# Patient Record
Sex: MALE | Race: BLACK OR AFRICAN AMERICAN | NOT HISPANIC OR LATINO | Employment: STUDENT | URBAN - METROPOLITAN AREA
[De-identification: names, ages, dates, MRNs, and addresses within clinical notes are randomized per-mention and may not be internally consistent; named-entity substitution may affect disease eponyms.]

---
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
## 2025-02-27 ENCOUNTER — OFFICE VISIT (OUTPATIENT)
Dept: URGENT CARE | Facility: CLINIC | Age: 20
End: 2025-02-27
Payer: COMMERCIAL

## 2025-02-27 VITALS
RESPIRATION RATE: 18 BRPM | HEIGHT: 67 IN | BODY MASS INDEX: 28.09 KG/M2 | WEIGHT: 179 LBS | TEMPERATURE: 98 F | HEART RATE: 80 BPM | OXYGEN SATURATION: 100 %

## 2025-02-27 DIAGNOSIS — H10.9 BACTERIAL CONJUNCTIVITIS OF LEFT EYE: Primary | ICD-10-CM

## 2025-02-27 PROCEDURE — 99214 OFFICE O/P EST MOD 30 MIN: CPT | Mod: S$GLB,,, | Performed by: PHYSICIAN ASSISTANT

## 2025-02-27 RX ORDER — LEVETIRACETAM 250 MG/1
250 TABLET ORAL
COMMUNITY
Start: 2024-12-18 | End: 2025-12-18

## 2025-02-27 RX ORDER — NEOMYCIN SULFATE, POLYMYXIN B SULFATE AND DEXAMETHASONE 3.5; 10000; 1 MG/ML; [USP'U]/ML; MG/ML
2 SUSPENSION/ DROPS OPHTHALMIC 3 TIMES DAILY
Qty: 5 ML | Refills: 0 | Status: SHIPPED | OUTPATIENT
Start: 2025-02-27

## 2025-02-27 NOTE — PROGRESS NOTES
"Subjective:      Patient ID: Joseph Vela is a 19 y.o. male.    Vitals:  height is 5' 7" (1.702 m) and weight is 81.2 kg (179 lb 0.2 oz). His tympanic temperature is 98 °F (36.7 °C). His pulse is 80. His respiration is 18 and oxygen saturation is 100%.     Chief Complaint: Eye Problem    Patient presents with redness in his left eye. Onset was Monday. Patient's father stated that he gave him eye drops that he had left over from when he had pink eye.     Eye Problem   The left eye is affected. This is a new problem. The current episode started in the past 7 days. The problem occurs constantly. The problem has been unchanged. There was no injury mechanism. The pain is at a severity of 0/10. The patient is experiencing no pain. There is No known exposure to pink eye. He Does not wear contacts. Associated symptoms include eye redness. He has tried eye drops for the symptoms. The treatment provided no relief.       Eyes:  Positive for eye redness.      Objective:     Physical Exam   Constitutional: He is oriented to person, place, and time. He appears well-developed.   HENT:   Head: Normocephalic and atraumatic.   Ears:   Right Ear: External ear normal.   Left Ear: External ear normal.   Nose: Nose normal.   Mouth/Throat: Oropharynx is clear and moist.   Eyes: EOM and lids are normal. Left conjunctiva is injected.   Neck: Trachea normal and phonation normal. Neck supple.   Cardiovascular: Normal rate.   Pulmonary/Chest: Effort normal.   Musculoskeletal: Normal range of motion.         General: Normal range of motion.   Neurological: He is alert and oriented to person, place, and time.   Skin: Skin is warm, dry and intact.   Psychiatric: His speech is normal and behavior is normal. Judgment and thought content normal.   Nursing note and vitals reviewed.      Assessment:     1. Bacterial conjunctivitis of left eye        Plan:   VSS. Patient non-toxic appearing. Discussed medication being prescribed.  Advised father to " follow up with PCP as needed.  Father verbalized understanding, agrees with the plan, and is comfortable with discharge.      Bacterial conjunctivitis of left eye  -     neomycin-polymyxin-dexamethasone (MAXITROL) 3.5mg/mL-10,000 unit/mL-0.1 % DrpS; Place 2 drops into both eyes 3 (three) times daily.  Dispense: 5 mL; Refill: 0

## 2025-02-27 NOTE — LETTER
February 27, 2025      [facility]  [address], [address]  [place] [address]  Phone: [phone]       Patient: Joseph Vela   YOB: 2005  Date of Visit: 02/27/2025    To Whom It May Concern:    Jodi Vela  was at [facility] on 02/27/2025. The patient may return to work/school on 2/28/25 with no restrictions. If you have any questions or concerns, or if I can be of further assistance, please do not hesitate to contact me.    Sincerely,      Ayana Henriquez PA-C